# Patient Record
Sex: MALE | Race: WHITE | NOT HISPANIC OR LATINO | Employment: FULL TIME | URBAN - METROPOLITAN AREA
[De-identification: names, ages, dates, MRNs, and addresses within clinical notes are randomized per-mention and may not be internally consistent; named-entity substitution may affect disease eponyms.]

---

## 2017-06-29 PROBLEM — R07.9 CHEST PAIN: Status: ACTIVE | Noted: 2017-06-29

## 2018-12-04 ENCOUNTER — CLINICAL SUPPORT (OUTPATIENT)
Dept: URGENT CARE | Facility: CLINIC | Age: 53
End: 2018-12-04
Payer: COMMERCIAL

## 2018-12-04 ENCOUNTER — OCCUPATIONAL HEALTH (OUTPATIENT)
Dept: URGENT CARE | Facility: CLINIC | Age: 53
End: 2018-12-04

## 2018-12-04 DIAGNOSIS — Z02.83 ENCOUNTER FOR EMPLOYMENT-RELATED DRUG TESTING: ICD-10-CM

## 2018-12-04 DIAGNOSIS — Z00.129 WELL ADOLESCENT VISIT: ICD-10-CM

## 2018-12-04 PROCEDURE — 80305 DRUG TEST PRSMV DIR OPT OBS: CPT | Mod: S$GLB,,, | Performed by: EMERGENCY MEDICINE

## 2018-12-04 PROCEDURE — 90471 IMMUNIZATION ADMIN: CPT | Mod: S$GLB,,, | Performed by: EMERGENCY MEDICINE

## 2018-12-04 PROCEDURE — 90756 CCIIV4 VACC ABX FREE IM: CPT | Mod: S$GLB,,, | Performed by: EMERGENCY MEDICINE

## 2019-03-04 ENCOUNTER — OCCUPATIONAL HEALTH (OUTPATIENT)
Dept: URGENT CARE | Facility: CLINIC | Age: 54
End: 2019-03-04

## 2019-03-04 PROCEDURE — 82075 CHG ASSAY OF BREATH ETHANOL: ICD-10-PCS | Mod: S$GLB,,, | Performed by: EMERGENCY MEDICINE

## 2019-03-04 PROCEDURE — 82075 ASSAY OF BREATH ETHANOL: CPT | Mod: S$GLB,,, | Performed by: EMERGENCY MEDICINE

## 2019-03-04 PROCEDURE — 80305 DRUG TEST PRSMV DIR OPT OBS: CPT | Mod: S$GLB,,, | Performed by: EMERGENCY MEDICINE

## 2019-03-04 PROCEDURE — 80305 PR COLLECTION ONLY DRUG SCREEN: ICD-10-PCS | Mod: S$GLB,,, | Performed by: EMERGENCY MEDICINE

## 2019-04-02 ENCOUNTER — OCCUPATIONAL HEALTH (OUTPATIENT)
Dept: URGENT CARE | Facility: CLINIC | Age: 54
End: 2019-04-02

## 2019-04-02 PROCEDURE — 80305 DRUG TEST PRSMV DIR OPT OBS: CPT | Mod: S$GLB,,, | Performed by: EMERGENCY MEDICINE

## 2019-04-02 PROCEDURE — 80305 PR COLLECTION ONLY DRUG SCREEN: ICD-10-PCS | Mod: S$GLB,,, | Performed by: EMERGENCY MEDICINE

## 2019-04-02 PROCEDURE — 82075 CHG ASSAY OF BREATH ETHANOL: ICD-10-PCS | Mod: S$GLB,,, | Performed by: EMERGENCY MEDICINE

## 2019-04-02 PROCEDURE — 82075 ASSAY OF BREATH ETHANOL: CPT | Mod: S$GLB,,, | Performed by: EMERGENCY MEDICINE

## 2019-04-03 ENCOUNTER — OCCUPATIONAL HEALTH (OUTPATIENT)
Dept: URGENT CARE | Facility: CLINIC | Age: 54
End: 2019-04-03

## 2019-04-03 PROCEDURE — 71045 PR XRAY, CHEST, 1 VIEW: ICD-10-PCS | Mod: S$GLB,,, | Performed by: NURSE PRACTITIONER

## 2019-04-03 PROCEDURE — 71045 X-RAY EXAM CHEST 1 VIEW: CPT | Mod: S$GLB,,, | Performed by: NURSE PRACTITIONER

## 2019-07-10 ENCOUNTER — OCCUPATIONAL HEALTH (OUTPATIENT)
Dept: URGENT CARE | Facility: CLINIC | Age: 54
End: 2019-07-10

## 2019-07-10 DIAGNOSIS — Z02.83 ENCOUNTER FOR EMPLOYMENT-RELATED DRUG TESTING: ICD-10-CM

## 2019-07-10 PROCEDURE — 80305 DRUG TEST PRSMV DIR OPT OBS: CPT | Mod: S$GLB,,, | Performed by: EMERGENCY MEDICINE

## 2019-07-10 PROCEDURE — 80305 PR COLLECTION ONLY DRUG SCREEN: ICD-10-PCS | Mod: S$GLB,,, | Performed by: EMERGENCY MEDICINE

## 2019-07-10 PROCEDURE — 82075 CHG ASSAY OF BREATH ETHANOL: ICD-10-PCS | Mod: S$GLB,,, | Performed by: EMERGENCY MEDICINE

## 2019-07-10 PROCEDURE — 82075 ASSAY OF BREATH ETHANOL: CPT | Mod: S$GLB,,, | Performed by: EMERGENCY MEDICINE

## 2020-01-08 ENCOUNTER — OCCUPATIONAL HEALTH (OUTPATIENT)
Dept: URGENT CARE | Facility: CLINIC | Age: 55
End: 2020-01-08

## 2020-01-08 PROCEDURE — 80305 PR COLLECTION ONLY DRUG SCREEN: ICD-10-PCS | Mod: S$GLB,,, | Performed by: EMERGENCY MEDICINE

## 2020-01-08 PROCEDURE — 82075 CHG ASSAY OF BREATH ETHANOL: ICD-10-PCS | Mod: S$GLB,,, | Performed by: EMERGENCY MEDICINE

## 2020-01-08 PROCEDURE — 82075 ASSAY OF BREATH ETHANOL: CPT | Mod: S$GLB,,, | Performed by: EMERGENCY MEDICINE

## 2020-01-08 PROCEDURE — 80305 DRUG TEST PRSMV DIR OPT OBS: CPT | Mod: S$GLB,,, | Performed by: EMERGENCY MEDICINE

## 2021-01-07 ENCOUNTER — OCCUPATIONAL HEALTH (OUTPATIENT)
Dept: URGENT CARE | Facility: CLINIC | Age: 56
End: 2021-01-07

## 2021-01-07 PROCEDURE — 80305 DRUG TEST PRSMV DIR OPT OBS: CPT | Mod: S$GLB,,, | Performed by: EMERGENCY MEDICINE

## 2021-01-07 PROCEDURE — 80305 PR COLLECTION ONLY DRUG SCREEN: ICD-10-PCS | Mod: S$GLB,,, | Performed by: EMERGENCY MEDICINE

## 2021-02-17 ENCOUNTER — OCCUPATIONAL HEALTH (OUTPATIENT)
Dept: URGENT CARE | Facility: CLINIC | Age: 56
End: 2021-02-17

## 2021-02-17 PROCEDURE — 82075 CHG ASSAY OF BREATH ETHANOL: ICD-10-PCS | Mod: S$GLB,,, | Performed by: EMERGENCY MEDICINE

## 2021-02-17 PROCEDURE — 82075 ASSAY OF BREATH ETHANOL: CPT | Mod: S$GLB,,, | Performed by: EMERGENCY MEDICINE

## 2021-02-17 PROCEDURE — 80305 PR COLLECTION ONLY DRUG SCREEN: ICD-10-PCS | Mod: S$GLB,,, | Performed by: EMERGENCY MEDICINE

## 2021-02-17 PROCEDURE — 80305 DRUG TEST PRSMV DIR OPT OBS: CPT | Mod: S$GLB,,, | Performed by: EMERGENCY MEDICINE

## 2021-05-10 ENCOUNTER — PATIENT MESSAGE (OUTPATIENT)
Dept: RESEARCH | Facility: HOSPITAL | Age: 56
End: 2021-05-10

## 2022-02-04 PROBLEM — I10 HYPERTENSION: Status: ACTIVE | Noted: 2022-02-04

## 2022-02-04 PROBLEM — E66.01 SEVERE OBESITY (BMI 35.0-39.9) WITH COMORBIDITY: Status: ACTIVE | Noted: 2022-02-04

## 2023-02-15 PROBLEM — E66.09 OBESITY DUE TO EXCESS CALORIES WITH SERIOUS COMORBIDITY: Status: ACTIVE | Noted: 2022-02-04

## 2024-03-03 PROBLEM — E66.09 OBESITY DUE TO EXCESS CALORIES WITH SERIOUS COMORBIDITY: Chronic | Status: ACTIVE | Noted: 2022-02-04

## 2024-03-03 PROBLEM — I10 HYPERTENSION: Chronic | Status: ACTIVE | Noted: 2022-02-04

## 2025-05-13 ENCOUNTER — OFFICE VISIT (OUTPATIENT)
Dept: PODIATRY | Facility: CLINIC | Age: 60
End: 2025-05-13
Payer: COMMERCIAL

## 2025-05-13 VITALS — BODY MASS INDEX: 29.45 KG/M2 | WEIGHT: 229.5 LBS | HEIGHT: 74 IN

## 2025-05-13 DIAGNOSIS — M79.674 GREAT TOE PAIN, RIGHT: ICD-10-CM

## 2025-05-13 DIAGNOSIS — R20.2 PARESTHESIA OF RIGHT FOOT: Primary | ICD-10-CM

## 2025-05-13 DIAGNOSIS — M20.5X9 HALLUX LIMITUS, UNSPECIFIED LATERALITY: ICD-10-CM

## 2025-05-13 PROCEDURE — 99999 PR PBB SHADOW E&M-EST. PATIENT-LVL II: CPT | Mod: PBBFAC,,, | Performed by: PODIATRIST

## 2025-05-13 PROCEDURE — 99203 OFFICE O/P NEW LOW 30 MIN: CPT | Mod: S$GLB,,, | Performed by: PODIATRIST

## 2025-05-13 PROCEDURE — 3044F HG A1C LEVEL LT 7.0%: CPT | Mod: CPTII,S$GLB,, | Performed by: PODIATRIST

## 2025-05-13 PROCEDURE — 3008F BODY MASS INDEX DOCD: CPT | Mod: CPTII,S$GLB,, | Performed by: PODIATRIST

## 2025-05-15 NOTE — PROGRESS NOTES
Subjective:     Patient ID: Roberto Griffith is a 59 y.o. male.    Chief Complaint: Toe Pain (Rt great toe pain radiates up towards the ankle/ hyper extended when he fell a couple of months ago/ has a burning shocking feeling in it on occasion)    Roberto is a 59 y.o. male with a past medical history of Bipolar disorder, COVID (01/2022), and Hyperlipidemia. The patient's chief complaint consists of Rt. Great toe pain that has been present for months that occurs sporadically and radiates proximally to the ankle.  Notes hyperextending the digit several months ago with a burning sensation thereafter.  Denies this impeding his weight bearing activity.  He has not attempted to self treat, as he was unsure of the etiology of said symptoms.  Denies any additional pedal complaints.      Past Medical History:   Diagnosis Date    Bipolar disorder     COVID 01/2022    Hyperlipidemia        Past Surgical History:   Procedure Laterality Date    CARDIAC SURGERY      angiopgram     COLONOSCOPY      MOUTH SURGERY         Family History   Problem Relation Name Age of Onset    Atrial fibrillation Mother      Heart attack Father  42    Heart disease Brother      Heart attack Maternal Uncle         Social History     Socioeconomic History    Marital status:      Spouse name: noble   Tobacco Use    Smoking status: Never    Smokeless tobacco: Never   Substance and Sexual Activity    Alcohol use: Yes     Alcohol/week: 1.0 standard drink of alcohol     Types: 1 Shots of liquor per week     Comment: every other day beer or cocktail    Drug use: No    Sexual activity: Yes     Partners: Female     Social Drivers of Health     Stress: Stress Concern Present (11/18/2020)    [employer] - Occupational Stress Questionnaire     Feeling of Stress : To some extent       Current Medications[1]    Review of patient's allergies indicates:   Allergen Reactions    Penicillins Other (See Comments)    Sulfa  (sulfonamide antibiotics) Other (See Comments)        Hemoglobin A1C   Date Value Ref Range Status   04/08/2025 5.0 4.0 - 5.6 % Final     Comment:     Reference Interval:  5.0 - 5.6 Normal   5.7 - 6.4 High Risk   > 6.5 Diabetic      Hgb A1c results are standardized based on the (NGSP) National   Glycohemoglobin Standardization Program.      Hemoglobin A1C levels are related to mean serum/plasma glucose   during the preceding 2-3 months.            Review of Systems   Constitutional: Negative for chills and fever.   Skin:  Negative for color change and nail changes.   Musculoskeletal:  Positive for joint pain. Negative for joint swelling, muscle cramps and muscle weakness.   Gastrointestinal:  Negative for nausea and vomiting.   Neurological:  Negative for numbness and paresthesias.   Psychiatric/Behavioral:  Negative for altered mental status.         Objective:     Physical Exam  Constitutional:       Appearance: Normal appearance. He is not ill-appearing.   Cardiovascular:      Pulses:           Dorsalis pedis pulses are 2+ on the right side and 2+ on the left side.        Posterior tibial pulses are 2+ on the right side and 2+ on the left side.      Comments: CFT is < 3 seconds bilateral.  Pedal hair growth is present bilateral.  No lower extremity edema noted bilateral.  Toes are warm to touch bilateral.    Musculoskeletal:         General: Deformity present.      Right lower leg: No edema.      Left lower leg: No edema.      Comments: Muscle strength 5/5 in all muscle groups bilateral.  No tenderness with ROM of foot/ankle joints bilateral.  Bilateral hallux limitus with the Rt. > Lt.  <45 degrees of dorsiflexion noted on the Rt. Side and < 50 on the Lt.  Palpable bony ridge noted to the dorsal aspect of bilateral 1st mtp joints.  Unable to reproduce pain on exam.  Slight crepitation noted with forced dorsiflexion of bilateral 1st mtp joint.   Skin:     Capillary Refill: Capillary refill takes 2 to 3 seconds.       Findings: No bruising, ecchymosis, erythema, signs of injury, laceration, lesion, petechiae, rash or wound.      Comments: Pedal skin has normal turgor, temperature, and texture bilateral.  Toenails x 10 appear normotrophic. Examination of the skin reveals no evidence of significant maceration, rashes, open lesions, suspicious appearing nevi or other concerning lesions.       Neurological:      General: No focal deficit present.      Mental Status: He is alert.      Sensory: No sensory deficit.      Motor: No weakness or atrophy.      Comments: Light touch is intact bilateral.             Assessment:      Encounter Diagnoses   Name Primary?    Great toe pain, right     Paresthesia of right foot Yes    Hallux limitus, unspecified laterality      Plan:     Roberto was seen today for toe pain.    Diagnoses and all orders for this visit:    Paresthesia of right foot    Great toe pain, right  -     Ambulatory referral/consult to Podiatry  -     ORTHOTIC DEVICE (DME)    Hallux limitus, unspecified laterality  -     ORTHOTIC DEVICE (DME)      I counseled the patient on his conditions, their implications and medical management.    Based on today's exam, the patient has hallux limitus of bilateral great toe, Rt. > Lt.    We discussed custom molded orthotics with a 1st ray cut out to decompress the joint and allow for greater plantarflexion of the met heads.    Given information regarding the vendor who provides this service.    Recommend wearing more of a hoka style shoe, as a rockerbottom sole does a better job of offloading the forefoot.    Briefly discussed intra-articular steroid injections should symptoms worsen.    Lastly, advised to consider 1st mtp joint fusion only if pain symptoms become unbearable and begins to affect activities of daily living.    RTC prn.    Steven Woodard DPM         [1]   Current Outpatient Medications   Medication Sig Dispense Refill    tirzepatide 5 mg/0.5 mL Kelly Inject 5 mg into the  skin every 7 days.       No current facility-administered medications for this visit.